# Patient Record
Sex: FEMALE | Race: WHITE
[De-identification: names, ages, dates, MRNs, and addresses within clinical notes are randomized per-mention and may not be internally consistent; named-entity substitution may affect disease eponyms.]

---

## 2020-12-10 ENCOUNTER — HOSPITAL ENCOUNTER (EMERGENCY)
Dept: HOSPITAL 47 - EC | Age: 50
Discharge: HOME | End: 2020-12-10
Payer: COMMERCIAL

## 2020-12-10 VITALS — RESPIRATION RATE: 18 BRPM | TEMPERATURE: 98.3 F

## 2020-12-10 VITALS — HEART RATE: 78 BPM | DIASTOLIC BLOOD PRESSURE: 88 MMHG | SYSTOLIC BLOOD PRESSURE: 123 MMHG

## 2020-12-10 DIAGNOSIS — F17.200: ICD-10-CM

## 2020-12-10 DIAGNOSIS — M54.9: Primary | ICD-10-CM

## 2020-12-10 PROCEDURE — 99283 EMERGENCY DEPT VISIT LOW MDM: CPT

## 2020-12-10 PROCEDURE — 71046 X-RAY EXAM CHEST 2 VIEWS: CPT

## 2020-12-10 PROCEDURE — 93005 ELECTROCARDIOGRAM TRACING: CPT

## 2020-12-10 PROCEDURE — 96372 THER/PROPH/DIAG INJ SC/IM: CPT

## 2020-12-10 NOTE — ED
General Adult HPI





- General


Chief complaint: Back Pain/Injury


Stated complaint: Back injury


Time Seen by Provider: 12/10/20 10:57


Source: patient, RN notes reviewed


Mode of arrival: ambulatory


Limitations: no limitations





- History of Present Illness


Initial comments: 





50-year-old female presents to the emergency room for upper back pain.  She 

reports this is been ongoing for about 5 days.  Patient reports that she was 

doing drywall last week when it started.  States she was reaching up and felt a 

sudden pain in her back.  Patient reports she has had this pain "a million 

times" but usually only lasts for 2-3 days.  She reports this time it has lasted

for 5 days.  Patient has been applying warm compresses.  She has also taken some

Motrin and Tylenol however has not taken this regularly.  She reports that 

movement and bending makes this pain worse.  She denies any pain radiating to 

her chest or abdomen or arms.Patient has no other complaints at this time 

including shortness of breath, chest pain, abdominal pain, nausea or vomiting, 

headache, or visual changes.





- Related Data


                                  Previous Rx's











 Medication  Instructions  Recorded


 


Lidocaine 5% Patch [Lidoderm 5% 1 patch TOPICAL DAILY PRN 5 Days 12/10/20





Patch] #5 patch 











                                    Allergies











Allergy/AdvReac Type Severity Reaction Status Date / Time


 


No Known Allergies Allergy   Verified 12/10/20 12:00














Review of Systems


ROS Statement: 


Those systems with pertinent positive or pertinent negative responses have been 

documented in the HPI.





ROS Other: All systems not noted in ROS Statement are negative.





Past Medical History


Past Medical History: No Reported History


History of Any Multi-Drug Resistant Organisms: None Reported


Past Surgical History: No Surgical Hx Reported


Past Psychological History: No Psychological Hx Reported


Smoking Status: Current every day smoker


Past Alcohol Use History: Occasional


Past Drug Use History: None Reported





General Exam


Limitations: no limitations


General appearance: alert, in no apparent distress


Head exam: Present: atraumatic, normocephalic, normal inspection


Eye exam: Present: normal appearance, PERRL, EOMI.  Absent: scleral icterus, 

conjunctival injection, periorbital swelling


ENT exam: Present: normal exam, mucous membranes moist


Neck exam: Present: normal inspection, full ROM.  Absent: tenderness, 

meningismus, lymphadenopathy


Respiratory exam: Present: normal lung sounds bilaterally.  Absent: respiratory 

distress, wheezes, rales, rhonchi, stridor


Cardiovascular Exam: Present: regular rate, normal rhythm, normal heart sounds. 

Absent: systolic murmur, diastolic murmur, rubs, gallop, clicks


GI/Abdominal exam: Present: soft, normal bowel sounds.  Absent: distended, 

tenderness, guarding, rebound, rigid


Extremities exam: Present: other (radial pulses 2+ and equal bilaterally)


Back exam: Present: vertebral tenderness (mild thoracic spine tenderness and 

pasaspinal tenderness worse on the right).  Absent: CVA tenderness (R), CVA 

tenderness (L)





Course


                                   Vital Signs











  12/10/20 12/10/20





  10:51 12:13


 


Temperature 98.3 F 


 


Pulse Rate 90 78


 


Respiratory 18 18





Rate  


 


Blood Pressure 123/83 123/88


 


O2 Sat by Pulse 97 100





Oximetry  














EKG Findings





- EKG Comments:


EKG Findings:: Normal sinus rhythm, ventricular rate 76, ND interval 140, QTC 

409





Medical Decision Making





- Medical Decision Making





Vitals are stable.  Patient has normal blood pressure.  Pain is consistent with 

previous episodes of upper back pain.  Pain is traumatic from reaching for 

drywall.  It is reproducible on exam with tenderness and worsening pain with 

movement.  She has no associated chest or abdominal pain.  No other associated 

symptoms.  Chest x-ray shows no acute cardiopulmonary process.  EKG is 

nonischemic.  He was given pain medication and did have some improvement in 

pain.  She was offered additional pain medication but declined.  She will be 

discharged home with Tylenol 3.  I recommended she follow up with orthopedics.  

She should return here for any worsening symptoms.I discussed this case with 

attending Dr. Ford who agrees with this assessment and treatment plan.





Disposition


Clinical Impression: 


 Mechanical back pain





Disposition: HOME SELF-CARE


Condition: Good


Instructions (If sedation given, give patient instructions):  Back Pain (ED)


Additional Instructions: 


Please take Motrin and Tylenol for pain.  You may alternate these every 3 hours.

 If pain is severe take Tylenol 3.  Do not drive or operate machinery while 

taking this.  Follow up with your doctor or orthopedics in one to 2 days.  If 

you have any worsening symptoms return to the emergency room.


Prescriptions: 


Lidocaine 5% Patch [Lidoderm 5% Patch] 1 patch TOPICAL DAILY PRN 5 Days #5 patch


 PRN Reason: Pain


Is patient prescribed a controlled substance at d/c from ED?: No


Referrals: 


Bahman Redmond MD [STAFF PHYSICIAN] - 1-2 days


Time of Disposition: 12:36

## 2020-12-10 NOTE — XR
EXAMINATION TYPE: XR chest 2V

 

DATE OF EXAM: 12/10/2020

 

COMPARISON: NONE

 

HISTORY: Back strain and pain after injury.

 

TECHNIQUE:  Frontal and lateral views of the chest are obtained.

 

FINDINGS:  There is no focal air space opacity, pleural effusion, or pneumothorax seen.  The cardiac 
silhouette size is within normal limits.   The osseous structures are intact.

 

IMPRESSION:  No acute cardiopulmonary process.

## 2021-07-31 ENCOUNTER — HOSPITAL ENCOUNTER (EMERGENCY)
Dept: HOSPITAL 47 - EC | Age: 51
Discharge: HOME | End: 2021-07-31
Payer: COMMERCIAL

## 2021-07-31 VITALS — RESPIRATION RATE: 18 BRPM | TEMPERATURE: 97.8 F

## 2021-07-31 VITALS — HEART RATE: 61 BPM | SYSTOLIC BLOOD PRESSURE: 116 MMHG | DIASTOLIC BLOOD PRESSURE: 78 MMHG

## 2021-07-31 DIAGNOSIS — F17.200: ICD-10-CM

## 2021-07-31 DIAGNOSIS — R11.2: ICD-10-CM

## 2021-07-31 DIAGNOSIS — R10.13: Primary | ICD-10-CM

## 2021-07-31 LAB
ALBUMIN SERPL-MCNC: 4.3 G/DL (ref 3.5–5)
ALP SERPL-CCNC: 136 U/L (ref 38–126)
ALT SERPL-CCNC: 14 U/L (ref 4–34)
ANION GAP SERPL CALC-SCNC: 10 MMOL/L
AST SERPL-CCNC: 28 U/L (ref 14–36)
BASOPHILS # BLD AUTO: 0.1 K/UL (ref 0–0.2)
BASOPHILS NFR BLD AUTO: 1 %
BUN SERPL-SCNC: 22 MG/DL (ref 7–17)
CALCIUM SPEC-MCNC: 10 MG/DL (ref 8.4–10.2)
CHLORIDE SERPL-SCNC: 104 MMOL/L (ref 98–107)
CO2 SERPL-SCNC: 21 MMOL/L (ref 22–30)
EOSINOPHIL # BLD AUTO: 0.4 K/UL (ref 0–0.7)
EOSINOPHIL NFR BLD AUTO: 4 %
ERYTHROCYTE [DISTWIDTH] IN BLOOD BY AUTOMATED COUNT: 4.11 M/UL (ref 3.8–5.4)
ERYTHROCYTE [DISTWIDTH] IN BLOOD: 13.8 % (ref 11.5–15.5)
GLUCOSE SERPL-MCNC: 86 MG/DL (ref 74–99)
HCT VFR BLD AUTO: 42.2 % (ref 34–46)
HGB BLD-MCNC: 14.5 GM/DL (ref 11.4–16)
HYALINE CASTS UR QL AUTO: 3 /LPF (ref 0–2)
KETONES UR QL STRIP.AUTO: (no result)
LIPASE SERPL-CCNC: 82 U/L (ref 23–300)
LYMPHOCYTES # SPEC AUTO: 1.1 K/UL (ref 1–4.8)
LYMPHOCYTES NFR SPEC AUTO: 13 %
MCH RBC QN AUTO: 35.2 PG (ref 25–35)
MCHC RBC AUTO-ENTMCNC: 34.2 G/DL (ref 31–37)
MCV RBC AUTO: 102.7 FL (ref 80–100)
MONOCYTES # BLD AUTO: 0.4 K/UL (ref 0–1)
MONOCYTES NFR BLD AUTO: 4 %
NEUTROPHILS # BLD AUTO: 6.5 K/UL (ref 1.3–7.7)
NEUTROPHILS NFR BLD AUTO: 76 %
PH UR: 5.5 [PH] (ref 5–8)
PLATELET # BLD AUTO: 380 K/UL (ref 150–450)
POTASSIUM SERPL-SCNC: 4.6 MMOL/L (ref 3.5–5.1)
PROT SERPL-MCNC: 7.1 G/DL (ref 6.3–8.2)
RBC UR QL: 6 /HPF (ref 0–5)
SODIUM SERPL-SCNC: 135 MMOL/L (ref 137–145)
SP GR UR: 1.03 (ref 1–1.03)
SQUAMOUS UR QL AUTO: 3 /HPF (ref 0–4)
UROBILINOGEN UR QL STRIP: 2 MG/DL (ref ?–2)
WBC # BLD AUTO: 8.5 K/UL (ref 3.8–10.6)
WBC # UR AUTO: 3 /HPF (ref 0–5)

## 2021-07-31 PROCEDURE — 96374 THER/PROPH/DIAG INJ IV PUSH: CPT

## 2021-07-31 PROCEDURE — 85025 COMPLETE CBC W/AUTO DIFF WBC: CPT

## 2021-07-31 PROCEDURE — 96375 TX/PRO/DX INJ NEW DRUG ADDON: CPT

## 2021-07-31 PROCEDURE — 36415 COLL VENOUS BLD VENIPUNCTURE: CPT

## 2021-07-31 PROCEDURE — 80053 COMPREHEN METABOLIC PANEL: CPT

## 2021-07-31 PROCEDURE — 99284 EMERGENCY DEPT VISIT MOD MDM: CPT

## 2021-07-31 PROCEDURE — 81025 URINE PREGNANCY TEST: CPT

## 2021-07-31 PROCEDURE — 81001 URINALYSIS AUTO W/SCOPE: CPT

## 2021-07-31 PROCEDURE — 76705 ECHO EXAM OF ABDOMEN: CPT

## 2021-07-31 PROCEDURE — 83690 ASSAY OF LIPASE: CPT

## 2021-07-31 PROCEDURE — 96361 HYDRATE IV INFUSION ADD-ON: CPT

## 2021-07-31 PROCEDURE — 83605 ASSAY OF LACTIC ACID: CPT

## 2021-07-31 RX ADMIN — KETOROLAC TROMETHAMINE STA MG: 15 INJECTION, SOLUTION INTRAMUSCULAR; INTRAVENOUS at 17:46

## 2021-07-31 RX ADMIN — ONDANSETRON STA MG: 2 INJECTION INTRAMUSCULAR; INTRAVENOUS at 17:47

## 2021-07-31 RX ADMIN — CEFAZOLIN ONE MLS/HR: 330 INJECTION, POWDER, FOR SOLUTION INTRAMUSCULAR; INTRAVENOUS at 17:44

## 2021-07-31 NOTE — US
EXAMINATION TYPE: US gallbladder

 

DATE OF EXAM: 7/31/2021

 

COMPARISON: NONE

 

CLINICAL HISTORY: ruq ab pain.

 

EXAM MEASUREMENTS:

 

Liver Length:  16.0 cm   

Gallbladder Wall:  0.2 cm   

CBD:  0.4 cm

Right Kidney:  10.7 x 4.7 x 5.3 cm

 

 

 

Pancreas:  wnl

Liver:  wnl  

Gallbladder:  wnl

**Evidence for sonographic Dewards's sign:  No

CBD:  wnl 

Right Kidney:  No hydronephrosis or masses seen 

 

 

 

IMPRESSION: Negative exam. No gallstones or dilated ducts.

## 2022-09-10 NOTE — ED
Abdominal Pain HPI





- General


Chief Complaint: Abdominal Pain


Stated Complaint: abd pain


Source: patient


Mode of arrival: ambulatory


Limitations: no limitations





- History of Present Illness


Initial Comments: 





Patient is a 51-year-old female who presents emergency Department with reported 

abdominal pain.  Patient states the pain has been going on for the past couple 

of weeks.  It is located in the epigastric region and appears to be exacerbated 

by food intake.  Describes it as a burning sensation.  She has had associated 

nausea and vomiting.  Reports it has been difficult to hold down any food or 

water, especially today.  Denies any previous history of similar.  No bloody em

esis.  Denies fevers or chills.  No cough, shortness of breath or exposure to 

Covid.  No previous abdominal surgeries.  Denies eating any tainted foods.  No 

sick contacts with similar symptoms.  Denies any chest pain.  No diarrhea, 

constipation, black or bloody stools.  No changes in her urination to include 

dysuria, hematuria or difficulty voiding.  Continues to have menstrual cycles.  

No abnormal vaginal bleeding or discharge.  No concern for pregnancy.  She took 

Aleve for her pain.  Reports that she frequently takes over-the-counter NSAIDs 

for pain.  Denies alcohol use.  No history of peptic ulcer disease.  No other 

alleviating, precipitating or modifying factors





- Related Data


                                  Previous Rx's











 Medication  Instructions  Recorded


 


Lidocaine 5% Patch [Lidoderm 5% 1 patch TOPICAL DAILY PRN 5 Days 12/10/20





Patch] #5 patch 


 


Omeprazole [PriLOSEC] 20 mg PO AC-BRKFST #30 cap 07/31/21


 


Ondansetron Odt [Zofran Odt] 4 mg PO Q8HR PRN #15 tab 07/31/21


 


Sucralfate [Carafate] 1 gm PO ACHS #56 tablet 07/31/21











                                    Allergies











Allergy/AdvReac Type Severity Reaction Status Date / Time


 


No Known Allergies Allergy   Verified 07/31/21 16:38














Review of Systems


ROS Statement: 


Those systems with pertinent positive or pertinent negative responses have been 

documented in the HPI.





ROS Other: All systems not noted in ROS Statement are negative.





Past Medical History


Past Medical History: No Reported History


History of Any Multi-Drug Resistant Organisms: None Reported


Past Surgical History: No Surgical Hx Reported


Past Psychological History: No Psychological Hx Reported


Smoking Status: Current every day smoker


Past Alcohol Use History: Occasional


Past Drug Use History: None Reported





General Exam


Limitations: no limitations





Course


                                   Vital Signs











  07/31/21 07/31/21 07/31/21





  16:26 17:49 19:00


 


Temperature 97.8 F  


 


Pulse Rate 75 65 61


 


Respiratory 18 18 18





Rate   


 


Blood Pressure 113/73 103/71 116/78


 


O2 Sat by Pulse 100 99 100





Oximetry   














Medical Decision Making





- Medical Decision Making





Upon arrival patient is placed in room 23.  A thorough history and physical exam

 is performed.  IV is established and the patient is given 1 here bolus of 

normal saline, 4 mg of Zofran and 15 mg of Toradol.  Laboratory studies are 

conducted an ultrasound is performed.  Laboratory studies are reviewed and 

results are discussed with the patient.  Gallbladder ultrasound is negative for 

signs of cholecystitis or cholelithiasis.  I did discuss diagnosis, differential

 and treatment options.  I will start the patient on Prilosec, Carafate and 

Zofran.  Patient is to take the medications as directed and follow-up with her 

primary care doctor.  The patient does not have a primary care doctor and 

therefore I do give her several recommendations.  I also recommended that she 

call her insurance company for referrals.  Patient may benefit from a HIDA scan 

or an EGD.  Recommended that she stop any NSAID intake or alcohol intake.  

Return to the emergency department for any new or worsening symptoms per patient

 agree to this and she is discharged home in stable condition





- Lab Data


Result diagrams: 


                                 07/31/21 17:48





                                 07/31/21 17:48


                                   Lab Results











  07/31/21 07/31/21 07/31/21 Range/Units





  17:02 17:02 17:48 


 


WBC    8.5  (3.8-10.6)  k/uL


 


RBC    4.11  (3.80-5.40)  m/uL


 


Hgb    14.5  (11.4-16.0)  gm/dL


 


Hct    42.2  (34.0-46.0)  %


 


MCV    102.7 H  (80.0-100.0)  fL


 


MCH    35.2 H  (25.0-35.0)  pg


 


MCHC    34.2  (31.0-37.0)  g/dL


 


RDW    13.8  (11.5-15.5)  %


 


Plt Count    380  (150-450)  k/uL


 


MPV    7.8  


 


Neutrophils %    76  %


 


Lymphocytes %    13  %


 


Monocytes %    4  %


 


Eosinophils %    4  %


 


Basophils %    1  %


 


Neutrophils #    6.5  (1.3-7.7)  k/uL


 


Lymphocytes #    1.1  (1.0-4.8)  k/uL


 


Monocytes #    0.4  (0-1.0)  k/uL


 


Eosinophils #    0.4  (0-0.7)  k/uL


 


Basophils #    0.1  (0-0.2)  k/uL


 


Macrocytosis    Slight  


 


Sodium     (137-145)  mmol/L


 


Potassium     (3.5-5.1)  mmol/L


 


Chloride     ()  mmol/L


 


Carbon Dioxide     (22-30)  mmol/L


 


Anion Gap     mmol/L


 


BUN     (7-17)  mg/dL


 


Creatinine     (0.52-1.04)  mg/dL


 


Est GFR (CKD-EPI)AfAm     (>60 ml/min/1.73 sqM)  


 


Est GFR (CKD-EPI)NonAf     (>60 ml/min/1.73 sqM)  


 


Glucose     (74-99)  mg/dL


 


Plasma Lactic Acid Leno     (0.7-2.0)  mmol/L


 


Calcium     (8.4-10.2)  mg/dL


 


Total Bilirubin     (0.2-1.3)  mg/dL


 


AST     (14-36)  U/L


 


ALT     (4-34)  U/L


 


Alkaline Phosphatase     ()  U/L


 


Total Protein     (6.3-8.2)  g/dL


 


Albumin     (3.5-5.0)  g/dL


 


Lipase     ()  U/L


 


Urine Color  Yellow    


 


Urine Appearance  Clear    (Clear)  


 


Urine pH  5.5    (5.0-8.0)  


 


Ur Specific Gravity  1.028    (1.001-1.035)  


 


Urine Protein  Trace H    (Negative)  


 


Urine Glucose (UA)  Negative    (Negative)  


 


Urine Ketones  2+ H    (Negative)  


 


Urine Blood  Negative    (Negative)  


 


Urine Nitrite  Negative    (Negative)  


 


Urine Bilirubin  Negative    (Negative)  


 


Urine Urobilinogen  2.0    (<2.0)  mg/dL


 


Ur Leukocyte Esterase  Small H    (Negative)  


 


Urine RBC  6 H    (0-5)  /hpf


 


Urine WBC  3    (0-5)  /hpf


 


Ur Squamous Epith Cells  3    (0-4)  /hpf


 


Urine Bacteria  Rare H    (None)  /hpf


 


Hyaline Casts  3 H    (0-2)  /lpf


 


Urine Mucus  Moderate H    (None)  /hpf


 


Urine HCG, Qual   Not Detected   (Not Detectd)  














  07/31/21 07/31/21 Range/Units





  17:48 17:48 


 


WBC    (3.8-10.6)  k/uL


 


RBC    (3.80-5.40)  m/uL


 


Hgb    (11.4-16.0)  gm/dL


 


Hct    (34.0-46.0)  %


 


MCV    (80.0-100.0)  fL


 


MCH    (25.0-35.0)  pg


 


MCHC    (31.0-37.0)  g/dL


 


RDW    (11.5-15.5)  %


 


Plt Count    (150-450)  k/uL


 


MPV    


 


Neutrophils %    %


 


Lymphocytes %    %


 


Monocytes %    %


 


Eosinophils %    %


 


Basophils %    %


 


Neutrophils #    (1.3-7.7)  k/uL


 


Lymphocytes #    (1.0-4.8)  k/uL


 


Monocytes #    (0-1.0)  k/uL


 


Eosinophils #    (0-0.7)  k/uL


 


Basophils #    (0-0.2)  k/uL


 


Macrocytosis    


 


Sodium  135 L   (137-145)  mmol/L


 


Potassium  4.6   (3.5-5.1)  mmol/L


 


Chloride  104   ()  mmol/L


 


Carbon Dioxide  21 L   (22-30)  mmol/L


 


Anion Gap  10   mmol/L


 


BUN  22 H   (7-17)  mg/dL


 


Creatinine  0.62   (0.52-1.04)  mg/dL


 


Est GFR (CKD-EPI)AfAm  >90   (>60 ml/min/1.73 sqM)  


 


Est GFR (CKD-EPI)NonAf  >90   (>60 ml/min/1.73 sqM)  


 


Glucose  86   (74-99)  mg/dL


 


Plasma Lactic Acid Leno   1.0  (0.7-2.0)  mmol/L


 


Calcium  10.0   (8.4-10.2)  mg/dL


 


Total Bilirubin  0.5   (0.2-1.3)  mg/dL


 


AST  28   (14-36)  U/L


 


ALT  14   (4-34)  U/L


 


Alkaline Phosphatase  136 H   ()  U/L


 


Total Protein  7.1   (6.3-8.2)  g/dL


 


Albumin  4.3   (3.5-5.0)  g/dL


 


Lipase  82   ()  U/L


 


Urine Color    


 


Urine Appearance    (Clear)  


 


Urine pH    (5.0-8.0)  


 


Ur Specific Gravity    (1.001-1.035)  


 


Urine Protein    (Negative)  


 


Urine Glucose (UA)    (Negative)  


 


Urine Ketones    (Negative)  


 


Urine Blood    (Negative)  


 


Urine Nitrite    (Negative)  


 


Urine Bilirubin    (Negative)  


 


Urine Urobilinogen    (<2.0)  mg/dL


 


Ur Leukocyte Esterase    (Negative)  


 


Urine RBC    (0-5)  /hpf


 


Urine WBC    (0-5)  /hpf


 


Ur Squamous Epith Cells    (0-4)  /hpf


 


Urine Bacteria    (None)  /hpf


 


Hyaline Casts    (0-2)  /lpf


 


Urine Mucus    (None)  /hpf


 


Urine HCG, Qual    (Not Detectd)  














Disposition


Clinical Impression: 


 Epigastric pain





Disposition: HOME SELF-CARE


Condition: Stable


Instructions (If sedation given, give patient instructions):  Epigastric Pain 

(ED)


Additional Instructions: 


Please stay away from alcohol and NSAIDS (aleve, advil, motrin). Do not eat any 

foods heavy in fats. Take the prescriptions as directed. Follow up with your PCP

 for re-evaluation - you may benefit from an EGD and HIDA scan. Return to the ED

 for any new or worsening symptoms. 


Prescriptions: 


Sucralfate [Carafate] 1 gm PO ACHS #56 tablet


Omeprazole [PriLOSEC] 20 mg PO AC-BRKFST #30 cap


Ondansetron Odt [Zofran Odt] 4 mg PO Q8HR PRN #15 tab


 PRN Reason: Nausea


Is patient prescribed a controlled substance at d/c from ED?: No


Referrals: 


Bereket Jovel [STAFF PHYSICIAN] - 1-2 days


Karen Keys MD [STAFF PHYSICIAN] - 1-2 days


Time of Disposition: 19:26
36.7